# Patient Record
Sex: FEMALE | Race: WHITE | Employment: UNEMPLOYED | URBAN - METROPOLITAN AREA
[De-identification: names, ages, dates, MRNs, and addresses within clinical notes are randomized per-mention and may not be internally consistent; named-entity substitution may affect disease eponyms.]

---

## 2018-03-01 ENCOUNTER — HOSPITAL ENCOUNTER (EMERGENCY)
Age: 48
Discharge: HOME | End: 2018-03-01
Attending: EMERGENCY MEDICINE
Payer: COMMERCIAL

## 2018-03-01 ENCOUNTER — APPOINTMENT (OUTPATIENT)
Dept: GENERAL RADIOLOGY | Age: 48
End: 2018-03-01
Attending: EMERGENCY MEDICINE
Payer: COMMERCIAL

## 2018-03-01 VITALS
HEART RATE: 71 BPM | BODY MASS INDEX: 29.66 KG/M2 | HEIGHT: 67 IN | SYSTOLIC BLOOD PRESSURE: 122 MMHG | RESPIRATION RATE: 16 BRPM | WEIGHT: 189 LBS | OXYGEN SATURATION: 99 % | DIASTOLIC BLOOD PRESSURE: 74 MMHG | TEMPERATURE: 97.6 F

## 2018-03-01 DIAGNOSIS — Z76.0 MEDICATION REFILL: Primary | ICD-10-CM

## 2018-03-01 PROCEDURE — 71046 X-RAY EXAM CHEST 2 VIEWS: CPT

## 2018-03-01 PROCEDURE — 99282 EMERGENCY DEPT VISIT SF MDM: CPT

## 2018-03-01 RX ORDER — GABAPENTIN 400 MG/1
400 CAPSULE ORAL 3 TIMES DAILY
COMMUNITY

## 2018-03-01 RX ORDER — LORAZEPAM 1 MG/1
TABLET ORAL
COMMUNITY

## 2018-03-01 RX ORDER — PRAZOSIN HYDROCHLORIDE 1 MG/1
1 CAPSULE ORAL
COMMUNITY

## 2018-03-01 RX ORDER — RISPERIDONE 1 MG/1
TABLET, FILM COATED ORAL DAILY
COMMUNITY

## 2018-03-01 RX ORDER — CLONIDINE HYDROCHLORIDE 0.1 MG/1
TABLET ORAL 2 TIMES DAILY
COMMUNITY

## 2018-03-01 RX ORDER — TRAZODONE HYDROCHLORIDE 50 MG/1
50 TABLET ORAL
COMMUNITY

## 2018-03-01 RX ORDER — LORAZEPAM 0.5 MG/1
1 TABLET ORAL
Qty: 10 TAB | Refills: 0 | Status: SHIPPED | OUTPATIENT
Start: 2018-03-01

## 2018-03-01 NOTE — CALL BACK NOTE
Spoke with patient about PCP got her an appt with pic on march 5 @1:00 pt notified and  notified and in agreance

## 2018-03-01 NOTE — ED PROVIDER NOTES
[address]  EMERGENCY DEPARTMENT HISTORY AND PHYSICAL EXAM    Date: 3/1/2018  Patient Name: Jocelyn Vigil  YOB: 1970  Medical Record Number: [id number]     History of Presenting Illness     Chief Complaint   Patient presents with    Medication Refill       History Provided By: Patient    Chief Complaint: Medication refill  Duration: 3 Weeks  Timing:  N/A  Severity: Mild  Modifying Factors: No relieving or worsening factors  Associated Symptoms: denies any other associated signs or symptoms    Additional History (Context):   2:08 PM  Jocelyn Vigil is a 41 y.o. female with PMHX HIV, PTSD, anxiety, & cocaine abuse, who presents to the emergency department for medication refill for psych and HIV medications, which pt has been out of for 3 weeks. Currently takes Complera, Ativan prn, and Clonidine for withdrawal from cocaine. No associated symptoms. Reports she recently moved to [place], [place] from [place]. Pt was just cleared for insurance and has yet to find an ID doctor. Pt had called her PCP in [place] and was told to go to ED to be rx a course to last until able to make an appointment with new PCP. Pt denies fever, chills, SI/HI, self harm, and any other Sx or complaints. Shx: +tobacco use, -EtOH use, +illicit drug use (marijuana), pt is 1 year sober from cocaine    PCP: None    Current Outpatient Prescriptions   Medication Sig Dispense Refill    emtricita-rilpivirine-tenof DF (COMPLERA) tablet Take 1 Tab by mouth daily.  LORazepam (ATIVAN) 1 mg tablet Take  by mouth every four (4) hours as needed for Anxiety.  cloNIDine HCl (CATAPRES) 0.1 mg tablet Take  by mouth two (2) times a day.  gabapentin (NEURONTIN) 400 mg capsule Take 400 mg by mouth three (3) times daily.  risperiDONE (RISPERDAL) 1 mg tablet Take  by mouth daily.  prazosin (MINIPRESS) 1 mg capsule Take 1 mg by mouth nightly.       traZODone (DESYREL) 50 mg tablet Take 50 mg by mouth nightly.  LORazepam (ATIVAN) 0.5 mg tablet Take 2 Tabs by mouth every eight (8) hours as needed for Anxiety. Max Daily Amount: 3 mg. 10 Tab 0       Past History     Past Medical History:  Past Medical History:   Diagnosis Date    Asthma     HIV disease ([facility] [address]        Past Surgical History:  Past Surgical History:   Procedure Laterality Date    HX GYN             Family History:  No family history on file. Social History:  Social History   Substance Use Topics    Smoking status: Current Every Day Smoker    Smokeless tobacco: Not on file    Alcohol use No       Allergies: Allergies   Allergen Reactions    Penicillins Anaphylaxis    Cogentin [Benztropine] Rash     And locked jaw      Other Medication Other (comments)     \"allergic to anesthesia\"    Tylenol [Acetaminophen] Hives    Vicodin [Hydrocodone-Acetaminophen] Hives         Review of Systems     Review of Systems   Constitutional: Negative for chills and fever. Psychiatric/Behavioral: Negative for self-injury and suicidal ideas. All other systems reviewed and are negative. Physical Exam     Vitals:    18 1401   BP: 122/74   Pulse: 71   Resp: 16   Temp: 97.6 °F (36.4 °C)   SpO2: 99%   Weight: 85.7 kg (189 lb)   Height: 5' 7\" (1.702 m)     Physical Exam   Constitutional: She is oriented to person, place, and time. She appears well-developed. HENT:   Head: Normocephalic. Eyes: Conjunctivae are normal.   Neck: Normal range of motion. Pulmonary/Chest: Effort normal.   Musculoskeletal: Normal range of motion. Neurological: She is alert and oriented to person, place, and time. Skin: Skin is warm and dry. Nursing note and vitals reviewed. Patient given appointment by  for Monday to assist patient in getting her daily medications. Patient given a limited amount of her prn ativan in case she has increased anxiety.  Patient understands reasons to return and is very thankful to have the appointment Monday    Diagnostic Study Results     Labs -   No results found for this or any previous visit (from the past 12 hour(s)). Medications Given in the ED:  Medications - No data to display     Medical Decision Making     I am the first provider for this patient. I reviewed the vital signs, available nursing notes, past medical history, past surgical history, family history and social history. Records Reviewed: Nursing Notes    Vital Signs-Reviewed the patient's vital signs. Patient Vitals for the past 12 hrs:   Temp Pulse Resp BP SpO2   03/01/18 1401 97.6 °F (36.4 °C) 71 16 122/74 99 %     Pulse Oximetry Analysis - Normal 99% on RA        Procedures:  Procedures     ED Course:   2:08 PM Initial assessment performed. The patients presenting problems have been discussed, and they are in agreement with the care plan formulated and outlined with them. Offering no questions or concerns at this time. Diagnosis and Disposition       Discharge Note:  2:16 PM  Rosemary Mackenzie's  results have been reviewed with her. She has been counseled regarding her diagnosis, treatment, and plan. She verbally conveys understanding and agreement of the signs, symptoms, diagnosis, treatment and prognosis and additionally agrees to follow up as discussed. She also agrees with the care-plan and conveys that all of her questions have been answered. I have also provided discharge instructions for her that include: educational information regarding their diagnosis and treatment, and list of reasons why they would want to return to the ED prior to their follow-up appointment, should her condition change. She has been provided with education for proper emergency department utilization. Clinical Impression:    1. Medication refill        PLAN:  1. D/C Home  2. Current Discharge Medication List      START taking these medications    Details   !!  LORazepam (ATIVAN) 0.5 mg tablet Take 2 Tabs by mouth every eight (8) hours as needed for Anxiety. Max Daily Amount: 3 mg. Qty: 10 Tab, Refills: 0    Associated Diagnoses: Medication refill       !! - Potential duplicate medications found. Please discuss with provider. CONTINUE these medications which have NOT CHANGED    Details   !! LORazepam (ATIVAN) 1 mg tablet Take  by mouth every four (4) hours as needed for Anxiety. !! - Potential duplicate medications found. Please discuss with provider. 3.   Follow-up Information     Follow up With Details Comments [address] [facility] for PCP Go in 4 days For primary care follow up as scheduled March 5, 2018 @ 1:00 PM  [facility]  [phone]    [facility] EMERGENCY DEPT Go to As needed, if symptoms worsen [address] [place] [id number] [phone]        _______________________________    Attestations: This note is prepared by Qing Jefferson, acting as Scribe for [facility]. [facility]:  The scribe's documentation has been prepared under my direction and personally reviewed by me in its entirety.   I confirm that the note above accurately reflects all work, treatment, procedures, and medical decision making performed by me.  _______________________________

## 2018-03-01 NOTE — ED TRIAGE NOTES
C/o being out of her psych and HIV medications for 3 weeks, states she recently moved here and doesn't have an infectious disease doctor. C/o cough for a few days. Sepsis Screening completed    (  )Patient meets SIRS criteria. (x  )Patient does not meet SIRS criteria.       SIRS Criteria is achieved when two or more of the following are present   Temperature < 96.8°F (36°C) or > 100.9°F (38.3°C)   Heart Rate > 90 beats per minute   Respiratory Rate > 20 breaths per minute   WBC count > 12,000 or <4,000 or > 10% bands

## 2018-03-01 NOTE — ED NOTES
See scanned documents for pt signing that that she rec'd discharge instructions. Pt discharged home ambulatory with a family member, no distress noted. No distress noted on discharge.

## 2018-03-01 NOTE — DISCHARGE INSTRUCTIONS
Medication Refill: Care Instructions  Your Care Instructions    Medicines are a big part of treatment for many health problems. So it can be upsetting to run out of your medicine. It may even be dangerous to stop a medicine suddenly. The doctor may have given you enough medicine to help you until you can see your regular doctor. The doctor has checked you carefully, but problems can develop later. If you notice any problems or new symptoms, get medical treatment right away. Follow-up care is a key part of your treatment and safety. Be sure to make and go to all appointments, and call your doctor if you are having problems. It's also a good idea to know your test results and keep a list of the medicines you take. How can you care for yourself at home? · Be safe with medicines. Take your medicines exactly as prescribed. · Know when you will run out of your medicine. Use a calendar to remind you to get a refill. Don't wait until you have only a few pills left. · Talk with your doctor or pharmacist about your medicine. Find out what to do if you miss a dose. When should you call for help? Call your doctor now or seek immediate medical care if:  ? · You have problems with your medicine. ? Watch closely for changes in your health, and be sure to contact your doctor if you have any problems. Where can you learn more? Go to http://amira-mary.info/. Enter K326 in the search box to learn more about \"Medication Refill: Care Instructions. \"  Current as of: August 14, 2016  Content Version: 11.4  © 4052-5114 [employer]. Care instructions adapted under license by [employer] (which disclaims liability or warranty for this information). If you have questions about a medical condition or this instruction, always ask your healthcare professional. [address] any warranty or liability for your use of this information.